# Patient Record
Sex: FEMALE | Race: WHITE | NOT HISPANIC OR LATINO | Employment: OTHER | URBAN - METROPOLITAN AREA
[De-identification: names, ages, dates, MRNs, and addresses within clinical notes are randomized per-mention and may not be internally consistent; named-entity substitution may affect disease eponyms.]

---

## 2022-05-23 ENCOUNTER — MEDICAL CORRESPONDENCE (OUTPATIENT)
Dept: HEALTH INFORMATION MANAGEMENT | Facility: CLINIC | Age: 62
End: 2022-05-23
Payer: COMMERCIAL

## 2022-05-25 ENCOUNTER — TRANSCRIBE ORDERS (OUTPATIENT)
Dept: OTHER | Age: 62
End: 2022-05-25
Payer: COMMERCIAL

## 2022-05-25 DIAGNOSIS — M79.671 BILATERAL FOOT PAIN: ICD-10-CM

## 2022-05-25 DIAGNOSIS — M79.672 BILATERAL FOOT PAIN: ICD-10-CM

## 2022-05-25 DIAGNOSIS — R20.2 PARESTHESIA OF BOTH FEET: ICD-10-CM

## 2022-05-25 DIAGNOSIS — G62.9 SENSORY NEUROPATHY: Primary | ICD-10-CM

## 2022-07-24 ENCOUNTER — HEALTH MAINTENANCE LETTER (OUTPATIENT)
Age: 62
End: 2022-07-24

## 2022-08-17 ENCOUNTER — OFFICE VISIT (OUTPATIENT)
Dept: NEUROLOGY | Facility: CLINIC | Age: 62
End: 2022-08-17
Attending: PSYCHIATRY & NEUROLOGY
Payer: COMMERCIAL

## 2022-08-17 ENCOUNTER — TELEPHONE (OUTPATIENT)
Dept: NEUROLOGY | Facility: CLINIC | Age: 62
End: 2022-08-17

## 2022-08-17 DIAGNOSIS — R20.2 PARESTHESIA OF BOTH FEET: ICD-10-CM

## 2022-08-17 DIAGNOSIS — G62.9 SENSORY NEUROPATHY: ICD-10-CM

## 2022-08-17 DIAGNOSIS — M79.672 BILATERAL FOOT PAIN: ICD-10-CM

## 2022-08-17 DIAGNOSIS — M79.671 BILATERAL FOOT PAIN: ICD-10-CM

## 2022-08-17 PROCEDURE — 88356 ANALYSIS NERVE: CPT | Mod: 90 | Performed by: PSYCHIATRY & NEUROLOGY

## 2022-08-17 PROCEDURE — 88350 IMFLUOR EA ADDL 1ANTB STN PX: CPT | Mod: 90 | Performed by: PSYCHIATRY & NEUROLOGY

## 2022-08-17 PROCEDURE — 11105 PUNCH BX SKIN EA SEP/ADDL: CPT | Performed by: PSYCHIATRY & NEUROLOGY

## 2022-08-17 PROCEDURE — 99000 SPECIMEN HANDLING OFFICE-LAB: CPT | Performed by: PSYCHIATRY & NEUROLOGY

## 2022-08-17 PROCEDURE — 88348 ELECTRON MICROSCOPY DX: CPT | Mod: 90 | Performed by: PSYCHIATRY & NEUROLOGY

## 2022-08-17 PROCEDURE — 11104 PUNCH BX SKIN SINGLE LESION: CPT | Performed by: PSYCHIATRY & NEUROLOGY

## 2022-08-17 PROCEDURE — 88305 TISSUE EXAM BY PATHOLOGIST: CPT | Mod: 90 | Performed by: PSYCHIATRY & NEUROLOGY

## 2022-08-17 PROCEDURE — 88346 IMFLUOR 1ST 1ANTB STAIN PX: CPT | Mod: 90 | Performed by: PSYCHIATRY & NEUROLOGY

## 2022-08-17 PROCEDURE — 88314 HISTOCHEMICAL STAINS ADD-ON: CPT | Mod: 90 | Performed by: PSYCHIATRY & NEUROLOGY

## 2022-08-17 NOTE — PROGRESS NOTES
Procedure Note: Neurodiagnostic skin biopsy of left foot and left calf  ?Reason for biopsy: Pain in feet. Eval for small fiber neuropathy.   Written informed consent was obtained.   ?   The standard sites on the left foot dorsum and medial calf were sterilely prepped. Xylocaine 1% was administered by subdermal injection. A total of 7 mL was used. A 3 mm punch biopsy was removed from each site. The specimens were placed in fixative. The specimen from the foot was placed in a vial labeled as left foot and the specimen from the calf was labeled as left calf. The presence of the specimen in the vial was verified by two individuals. After verification the samples were sent to the neuropathology laboratory for analysis. The biopsy sites were dressed with a pressure bandage. Estimated blood loss was less than 10 drops total. The patient was informed about post-procedure bandage and biopsy site care. She was advised that it might take up to 4-6 weeks for results of the test to be available. No complications.

## 2022-08-17 NOTE — TELEPHONE ENCOUNTER
Two skin samples taken from Left foot and Left calf. Sent to lab via tube system. To be analyzed at [facility] for ENF density.

## 2022-09-20 LAB — SCANNED LAB RESULT: NORMAL

## 2022-09-27 ENCOUNTER — MYC MEDICAL ADVICE (OUTPATIENT)
Dept: NEUROLOGY | Facility: CLINIC | Age: 62
End: 2022-09-27

## 2022-10-03 ENCOUNTER — HEALTH MAINTENANCE LETTER (OUTPATIENT)
Age: 62
End: 2022-10-03

## 2022-10-25 NOTE — TELEPHONE ENCOUNTER
I communicated with the [facility] about this and left a voice mail message for the patient apologizing for the error. I have initiated a quality improvement process to reduce the likelihood of such errors in the future. I discussed the error with clinic management. I did not enter a [facility] report because I was informed it had already been done.    Kiran Mittal M.D.  
[id number]

## 2023-02-12 ENCOUNTER — HEALTH MAINTENANCE LETTER (OUTPATIENT)
Age: 63
End: 2023-02-12

## 2024-03-10 ENCOUNTER — HEALTH MAINTENANCE LETTER (OUTPATIENT)
Age: 64
End: 2024-03-10

## 2024-07-28 ENCOUNTER — HEALTH MAINTENANCE LETTER (OUTPATIENT)
Age: 64
End: 2024-07-28

## 2025-03-16 ENCOUNTER — HEALTH MAINTENANCE LETTER (OUTPATIENT)
Age: 65
End: 2025-03-16

## 2025-06-08 ENCOUNTER — HEALTH MAINTENANCE LETTER (OUTPATIENT)
Age: 65
End: 2025-06-08

## (undated) RX ORDER — LIDOCAINE HYDROCHLORIDE 10 MG/ML
INJECTION, SOLUTION EPIDURAL; INFILTRATION; INTRACAUDAL; PERINEURAL
Status: DISPENSED
Start: 2022-08-17